# Patient Record
Sex: FEMALE | Race: WHITE | NOT HISPANIC OR LATINO | URBAN - NONMETROPOLITAN AREA
[De-identification: names, ages, dates, MRNs, and addresses within clinical notes are randomized per-mention and may not be internally consistent; named-entity substitution may affect disease eponyms.]

---

## 2024-03-13 ENCOUNTER — OV NP (OUTPATIENT)
Dept: URBAN - NONMETROPOLITAN AREA CLINIC 13 | Facility: CLINIC | Age: 24
End: 2024-03-13
Payer: COMMERCIAL

## 2024-03-13 VITALS
BODY MASS INDEX: 22.36 KG/M2 | DIASTOLIC BLOOD PRESSURE: 85 MMHG | HEIGHT: 64 IN | SYSTOLIC BLOOD PRESSURE: 137 MMHG | WEIGHT: 131 LBS | HEART RATE: 83 BPM

## 2024-03-13 DIAGNOSIS — K59.09 OTHER CONSTIPATION: ICD-10-CM

## 2024-03-13 LAB
A/G RATIO: 1.3
ALBUMIN: 4.3
ALKALINE PHOSPHATASE: 45
ALT (SGPT): 11
ANION GAP: 14
AST (SGOT): 20
BILIRUBIN TOTAL: 0.3
BLOOD UREA NITROGEN: 11
BUN / CREAT RATIO: 15
CALCIUM: 9.5
CHLORIDE: 103
CO2: 24
CREATININE, SERUM: 0.75
EGFR (CKD-EPI): >60
GLUCOSE: 100
POTASSIUM: 4.4
PROTEIN TOTAL: 7.6
SEDIMENTATION RATE, ERYTHROCYTE: 16
SODIUM: 137
TSH: 2.22

## 2024-03-13 PROCEDURE — 99204 OFFICE O/P NEW MOD 45 MIN: CPT | Performed by: NURSE PRACTITIONER

## 2024-03-13 NOTE — HPI-TODAY'S VISIT:
3/13/2024 Ms. Anastasia Blakely is a 23 year old female here for severe constipation. She has a hx of intermittent constipation that would resolve after her menstrual cycle. She moved to [place] 2 months ago and has not been working. She has been eating "cheap" which is low in fiber. She has been less active. She has been constipated for the last 6 weeks or so. She has not had any changes in her weight or appetite. She denies any blood in her stool. She has tried colace and it helped. She is having a BM daily now but not complete. She is very concerned. CS

## 2024-03-14 ENCOUNTER — OV NP (OUTPATIENT)
Dept: URBAN - NONMETROPOLITAN AREA CLINIC 13 | Facility: CLINIC | Age: 24
End: 2024-03-14

## 2024-03-17 LAB
GAMMAGLOBULIN; IGA: 299
INTERP CELIAC DISEASE: (no result)
TTG AB IGA: <1

## 2024-04-05 ENCOUNTER — OV NP (OUTPATIENT)
Dept: URBAN - NONMETROPOLITAN AREA CLINIC 2 | Facility: CLINIC | Age: 24
End: 2024-04-05

## 2024-04-16 ENCOUNTER — OV NP (OUTPATIENT)
Dept: URBAN - NONMETROPOLITAN AREA CLINIC 13 | Facility: CLINIC | Age: 24
End: 2024-04-16
Payer: COMMERCIAL

## 2024-04-16 ENCOUNTER — LAB (OUTPATIENT)
Dept: URBAN - NONMETROPOLITAN AREA CLINIC 13 | Facility: CLINIC | Age: 24
End: 2024-04-16

## 2024-04-16 ENCOUNTER — OV NP (OUTPATIENT)
Dept: URBAN - NONMETROPOLITAN AREA CLINIC 2 | Facility: CLINIC | Age: 24
End: 2024-04-16

## 2024-04-16 VITALS
DIASTOLIC BLOOD PRESSURE: 83 MMHG | HEART RATE: 79 BPM | SYSTOLIC BLOOD PRESSURE: 120 MMHG | WEIGHT: 131.8 LBS | BODY MASS INDEX: 22.5 KG/M2 | HEIGHT: 64 IN

## 2024-04-16 DIAGNOSIS — K62.5 BRIGHT RED BLOOD PER RECTUM: ICD-10-CM

## 2024-04-16 DIAGNOSIS — K59.09 OTHER CONSTIPATION: ICD-10-CM

## 2024-04-16 PROCEDURE — 99214 OFFICE O/P EST MOD 30 MIN: CPT | Performed by: NURSE PRACTITIONER

## 2024-04-16 RX ORDER — LINACLOTIDE 145 UG/1
1 CAPSULE AT LEAST 30 MINUTES BEFORE THE FIRST MEAL OF THE DAY ON AN EMPTY STOMACH CAPSULE, GELATIN COATED ORAL ONCE A DAY
Qty: 90 | Refills: 3 | Status: ACTIVE | COMMUNITY
Start: 2024-03-25 | End: 2025-03-20

## 2024-04-16 NOTE — HPI-OTHER HISTORIES
3/13/2024 Ms. Anastasia Blakely is a 23 year old female here for severe constipation. She has a hx of intermittent constipation that would resolve after her menstrual cycle. She moved to [place] 2 months ago and has not been working. She has been eating "cheap" which is low in fiber. She has been less active. She has been constipated for the last 6 weeks or so. She has not had any changes in her weight or appetite. She denies any blood in her stool. She has tried colace and it helped. She is having a BM daily now but not complete. She is very concerned. CS  3/14/2024 CT abd/pelvis: ovarian cysts otherwise normal

## 2024-04-16 NOTE — HPI-TODAY'S VISIT:
4/16/2024 Ms. Anastasia Blakely is here for f/u of severe constipation. At her last OV, she had 6 weeks of constipation, she had normal labs and CT. She was given linzess 145mcg. This is helping but giving her more cramping and looser stool. She is seeing more mucous and some red in her stool. CS

## 2024-05-16 ENCOUNTER — OFFICE VISIT (OUTPATIENT)
Dept: URBAN - NONMETROPOLITAN AREA CLINIC 13 | Facility: CLINIC | Age: 24
End: 2024-05-16

## 2024-05-30 ENCOUNTER — OFFICE VISIT (OUTPATIENT)
Dept: URBAN - NONMETROPOLITAN AREA SURGERY CENTER 1 | Facility: SURGERY CENTER | Age: 24
End: 2024-05-30

## 2024-06-18 ENCOUNTER — DASHBOARD ENCOUNTERS (OUTPATIENT)
Age: 24
End: 2024-06-18

## 2024-06-18 ENCOUNTER — OFFICE VISIT (OUTPATIENT)
Dept: URBAN - NONMETROPOLITAN AREA CLINIC 13 | Facility: CLINIC | Age: 24
End: 2024-06-18
Payer: COMMERCIAL

## 2024-06-18 VITALS
BODY MASS INDEX: 22.23 KG/M2 | DIASTOLIC BLOOD PRESSURE: 71 MMHG | WEIGHT: 130.2 LBS | HEART RATE: 80 BPM | SYSTOLIC BLOOD PRESSURE: 108 MMHG | HEIGHT: 64 IN

## 2024-06-18 DIAGNOSIS — K59.09 OTHER CONSTIPATION: ICD-10-CM

## 2024-06-18 DIAGNOSIS — Z86.010 PERSONAL HISTORY OF COLONIC POLYPS: ICD-10-CM

## 2024-06-18 DIAGNOSIS — K62.5 BRIGHT RED BLOOD PER RECTUM: ICD-10-CM

## 2024-06-18 PROBLEM — 428283002: Status: ACTIVE | Noted: 2024-06-18

## 2024-06-18 PROCEDURE — 99213 OFFICE O/P EST LOW 20 MIN: CPT | Performed by: NURSE PRACTITIONER

## 2024-06-18 RX ORDER — LINACLOTIDE 72 UG/1
1 CAPSULE AT LEAST 30 MINUTES BEFORE THE FIRST MEAL OF THE DAY ON AN EMPTY STOMACH CAPSULE, GELATIN COATED ORAL ONCE A DAY
Qty: 90 | Refills: 3 | OUTPATIENT
Start: 2024-06-18 | End: 2025-06-13

## 2024-06-18 RX ORDER — DROSPIRENONE AND ETHINYL ESTRADIOL 0.02-3(28)
1 TABLET KIT ORAL ONCE A DAY
Status: ACTIVE | COMMUNITY

## 2024-06-18 RX ORDER — FLUOXETINE HYDROCHLORIDE 10 MG/1
1 CAPSULE CAPSULE ORAL ONCE A DAY
Status: ACTIVE | COMMUNITY

## 2024-06-18 RX ORDER — LINACLOTIDE 145 UG/1
1 CAPSULE AT LEAST 30 MINUTES BEFORE THE FIRST MEAL OF THE DAY ON AN EMPTY STOMACH CAPSULE, GELATIN COATED ORAL ONCE A DAY
Qty: 90 | Refills: 3 | Status: ACTIVE | COMMUNITY
Start: 2024-03-25 | End: 2025-03-20

## 2024-06-18 NOTE — HPI-TODAY'S VISIT:
6/18/2024 Ms. Anastasia Blakely is here for f/u of IBS-C. She had negative labs, CT, and colonoscopy. She is taking 72mcg every day or everyother day with fairlly normal BM. She has noticed an increase in smell and oil in her stool at times. She is no longer taking a probiotic. Overall, she is feeling well. CS

## 2024-06-18 NOTE — HPI-OTHER HISTORIES
3/13/2024 Ms. Anastasia Blakely is a 23 year old female here for severe constipation. She has a hx of intermittent constipation that would resolve after her menstrual cycle. She moved to [place] 2 months ago and has not been working. She has been eating "cheap" which is low in fiber. She has been less active. She has been constipated for the last 6 weeks or so. She has not had any changes in her weight or appetite. She denies any blood in her stool. She has tried colace and it helped. She is having a BM daily now but not complete. She is very concerned. CS  3/14/2024 CT abd/pelvis: ovarian cysts otherwise normal  4/16/2024 Ms. Anastasia Blakely is here for f/u of severe constipation. At her last OV, she had 6 weeks of constipation, she had normal labs and CT. She was given linzess 145mcg. This is helping but giving her more cramping and looser stool. She is seeing more mucous and some red in her stool. CS  5/30/2024 Colonosocpy: 5mm TA polyp at 20cm proximal to anus

## 2024-09-18 ENCOUNTER — OFFICE VISIT (OUTPATIENT)
Dept: URBAN - NONMETROPOLITAN AREA CLINIC 13 | Facility: CLINIC | Age: 24
End: 2024-09-18
Payer: COMMERCIAL

## 2024-09-18 VITALS
HEIGHT: 64 IN | DIASTOLIC BLOOD PRESSURE: 70 MMHG | BODY MASS INDEX: 23.05 KG/M2 | SYSTOLIC BLOOD PRESSURE: 108 MMHG | WEIGHT: 135 LBS | HEART RATE: 88 BPM

## 2024-09-18 DIAGNOSIS — K59.09 OTHER CONSTIPATION: ICD-10-CM

## 2024-09-18 DIAGNOSIS — Z86.010 PERSONAL HISTORY OF COLONIC POLYPS: ICD-10-CM

## 2024-09-18 DIAGNOSIS — K62.5 BRIGHT RED BLOOD PER RECTUM: ICD-10-CM

## 2024-09-18 PROCEDURE — 99213 OFFICE O/P EST LOW 20 MIN: CPT | Performed by: NURSE PRACTITIONER

## 2024-09-18 RX ORDER — FLUOXETINE HYDROCHLORIDE 10 MG/1
1 CAPSULE CAPSULE ORAL ONCE A DAY
Status: ACTIVE | COMMUNITY

## 2024-09-18 RX ORDER — LINACLOTIDE 72 UG/1
1 CAPSULE AT LEAST 30 MINUTES BEFORE THE FIRST MEAL OF THE DAY ON AN EMPTY STOMACH CAPSULE, GELATIN COATED ORAL ONCE A DAY
Qty: 90 | Refills: 3 | Status: ACTIVE | COMMUNITY
Start: 2024-06-18 | End: 2025-06-13

## 2024-09-18 RX ORDER — LINACLOTIDE 145 UG/1
1 CAPSULE AT LEAST 30 MINUTES BEFORE THE FIRST MEAL OF THE DAY ON AN EMPTY STOMACH CAPSULE, GELATIN COATED ORAL ONCE A DAY
Qty: 90 | Refills: 3 | Status: ACTIVE | COMMUNITY
Start: 2024-03-25 | End: 2025-03-20

## 2024-09-18 RX ORDER — DROSPIRENONE AND ETHINYL ESTRADIOL 0.02-3(28)
1 TABLET KIT ORAL ONCE A DAY
Status: ACTIVE | COMMUNITY

## 2024-09-18 RX ORDER — LINACLOTIDE 72 UG/1
1 CAPSULE AT LEAST 30 MINUTES BEFORE THE FIRST MEAL OF THE DAY ON AN EMPTY STOMACH CAPSULE, GELATIN COATED ORAL ONCE A DAY
Qty: 90 | Refills: 3 | OUTPATIENT

## 2024-09-18 NOTE — HPI-OTHER HISTORIES
3/13/2024 Ms. Anastasia Blakely is a 23 year old female here for severe constipation. She has a hx of intermittent constipation that would resolve after her menstrual cycle. She moved to [place] 2 months ago and has not been working. She has been eating "cheap" which is low in fiber. She has been less active. She has been constipated for the last 6 weeks or so. She has not had any changes in her weight or appetite. She denies any blood in her stool. She has tried colace and it helped. She is having a BM daily now but not complete. She is very concerned. CS  3/14/2024 CT abd/pelvis: ovarian cysts otherwise normal  4/16/2024 Ms. Anastasia Blakely is here for f/u of severe constipation. At her last OV, she had 6 weeks of constipation, she had normal labs and CT. She was given linzess 145mcg. This is helping but giving her more cramping and looser stool. She is seeing more mucous and some red in her stool. CS  5/30/2024 Colonosocpy: 5mm TA polyp at 20cm proximal to anus  6/18/2024 Ms. Anastasia Blakely is here for f/u of IBS-C. She had negative labs, CT, and colonoscopy. She is taking 72mcg every day or everyother day with fairlly normal BM. She has noticed an increase in smell and oil in her stool at times. She is no longer taking a probiotic. Overall, she is feeling well. CS

## 2024-09-18 NOTE — HPI-TODAY'S VISIT:
[id number] Ms. Anastasia Blakely is here for f/u of IBS-C. She had negative labs, CT, and colonoscopy. She is taking 72mcg only as needed with fairlly normal BM. At her last OV, she noticed an increase in smell and oil in her stool at times. She took probiotics for a month and this resolved.  Overall, she is feeling well. CS